# Patient Record
Sex: FEMALE | Race: WHITE | URBAN - METROPOLITAN AREA
[De-identification: names, ages, dates, MRNs, and addresses within clinical notes are randomized per-mention and may not be internally consistent; named-entity substitution may affect disease eponyms.]

---
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
## 2025-05-09 ENCOUNTER — APPOINTMENT (OUTPATIENT)
Dept: CT IMAGING | Facility: CLINIC | Age: 25
End: 2025-05-09
Attending: PHYSICIAN ASSISTANT
Payer: COMMERCIAL

## 2025-05-09 ENCOUNTER — HOSPITAL ENCOUNTER (EMERGENCY)
Facility: CLINIC | Age: 25
Discharge: HOME | End: 2025-05-09
Admitting: PHYSICIAN ASSISTANT
Payer: COMMERCIAL

## 2025-05-09 ENCOUNTER — APPOINTMENT (OUTPATIENT)
Dept: RADIOLOGY | Facility: CLINIC | Age: 25
End: 2025-05-09
Attending: PHYSICIAN ASSISTANT
Payer: COMMERCIAL

## 2025-05-09 VITALS
HEART RATE: 73 BPM | DIASTOLIC BLOOD PRESSURE: 88 MMHG | RESPIRATION RATE: 17 BRPM | TEMPERATURE: 97.5 F | HEIGHT: 64 IN | WEIGHT: 220 LBS | OXYGEN SATURATION: 96 % | SYSTOLIC BLOOD PRESSURE: 142 MMHG | BODY MASS INDEX: 37.56 KG/M2

## 2025-05-09 DIAGNOSIS — S09.90XA INJURY OF HEAD, INITIAL ENCOUNTER: ICD-10-CM

## 2025-05-09 DIAGNOSIS — G40.919 BREAKTHROUGH SEIZURE (H): ICD-10-CM

## 2025-05-09 DIAGNOSIS — S49.92XA SHOULDER INJURY, LEFT, INITIAL ENCOUNTER: ICD-10-CM

## 2025-05-09 LAB
ANION GAP SERPL CALCULATED.3IONS-SCNC: 14 MMOL/L (ref 7–15)
BASOPHILS # BLD AUTO: 0 10E3/UL (ref 0–0.2)
BASOPHILS NFR BLD AUTO: 0 %
BUN SERPL-MCNC: 9.8 MG/DL (ref 6–20)
CALCIUM SERPL-MCNC: 9.2 MG/DL (ref 8.8–10.4)
CHLORIDE SERPL-SCNC: 98 MMOL/L (ref 98–107)
CREAT SERPL-MCNC: 0.67 MG/DL (ref 0.51–0.95)
EGFRCR SERPLBLD CKD-EPI 2021: >90 ML/MIN/1.73M2
EOSINOPHIL # BLD AUTO: 0.2 10E3/UL (ref 0–0.7)
EOSINOPHIL NFR BLD AUTO: 2 %
ERYTHROCYTE [DISTWIDTH] IN BLOOD BY AUTOMATED COUNT: 15.9 % (ref 10–15)
GLUCOSE SERPL-MCNC: 101 MG/DL (ref 70–99)
HCG SERPL QL: NEGATIVE
HCO3 SERPL-SCNC: 24 MMOL/L (ref 22–29)
HCT VFR BLD AUTO: 37.5 % (ref 35–47)
HGB BLD-MCNC: 11.7 G/DL (ref 11.7–15.7)
IMM GRANULOCYTES # BLD: 0.1 10E3/UL
IMM GRANULOCYTES NFR BLD: 0 %
LYMPHOCYTES # BLD AUTO: 3.3 10E3/UL (ref 0.8–5.3)
LYMPHOCYTES NFR BLD AUTO: 24 %
MAGNESIUM SERPL-MCNC: 2 MG/DL (ref 1.7–2.3)
MCH RBC QN AUTO: 24.1 PG (ref 26.5–33)
MCHC RBC AUTO-ENTMCNC: 31.2 G/DL (ref 31.5–36.5)
MCV RBC AUTO: 77 FL (ref 78–100)
MONOCYTES # BLD AUTO: 0.7 10E3/UL (ref 0–1.3)
MONOCYTES NFR BLD AUTO: 5 %
NEUTROPHILS # BLD AUTO: 9.2 10E3/UL (ref 1.6–8.3)
NEUTROPHILS NFR BLD AUTO: 68 %
NRBC # BLD AUTO: 0 10E3/UL
NRBC BLD AUTO-RTO: 0 /100
PLATELET # BLD AUTO: 458 10E3/UL (ref 150–450)
POTASSIUM SERPL-SCNC: 3.7 MMOL/L (ref 3.4–5.3)
RBC # BLD AUTO: 4.86 10E6/UL (ref 3.8–5.2)
SODIUM SERPL-SCNC: 136 MMOL/L (ref 135–145)
WBC # BLD AUTO: 13.5 10E3/UL (ref 4–11)

## 2025-05-09 PROCEDURE — 70450 CT HEAD/BRAIN W/O DYE: CPT

## 2025-05-09 PROCEDURE — 96372 THER/PROPH/DIAG INJ SC/IM: CPT | Performed by: PHYSICIAN ASSISTANT

## 2025-05-09 PROCEDURE — 99285 EMERGENCY DEPT VISIT HI MDM: CPT | Mod: 25

## 2025-05-09 PROCEDURE — 250N000013 HC RX MED GY IP 250 OP 250 PS 637: Performed by: PHYSICIAN ASSISTANT

## 2025-05-09 PROCEDURE — 80048 BASIC METABOLIC PNL TOTAL CA: CPT

## 2025-05-09 PROCEDURE — 85025 COMPLETE CBC W/AUTO DIFF WBC: CPT

## 2025-05-09 PROCEDURE — 83735 ASSAY OF MAGNESIUM: CPT | Performed by: EMERGENCY MEDICINE

## 2025-05-09 PROCEDURE — 250N000011 HC RX IP 250 OP 636: Mod: JZ | Performed by: PHYSICIAN ASSISTANT

## 2025-05-09 PROCEDURE — 82565 ASSAY OF CREATININE: CPT

## 2025-05-09 PROCEDURE — 73030 X-RAY EXAM OF SHOULDER: CPT | Mod: LT

## 2025-05-09 PROCEDURE — 36415 COLL VENOUS BLD VENIPUNCTURE: CPT

## 2025-05-09 PROCEDURE — 80175 DRUG SCREEN QUAN LAMOTRIGINE: CPT | Performed by: EMERGENCY MEDICINE

## 2025-05-09 PROCEDURE — 84703 CHORIONIC GONADOTROPIN ASSAY: CPT

## 2025-05-09 PROCEDURE — 73080 X-RAY EXAM OF ELBOW: CPT | Mod: LT

## 2025-05-09 RX ORDER — ACETAMINOPHEN 325 MG/1
975 TABLET ORAL ONCE
Status: COMPLETED | OUTPATIENT
Start: 2025-05-09 | End: 2025-05-09

## 2025-05-09 RX ORDER — KETOROLAC TROMETHAMINE 15 MG/ML
15 INJECTION, SOLUTION INTRAMUSCULAR; INTRAVENOUS ONCE
Status: DISCONTINUED | OUTPATIENT
Start: 2025-05-09 | End: 2025-05-09

## 2025-05-09 RX ORDER — KETOROLAC TROMETHAMINE 15 MG/ML
15 INJECTION, SOLUTION INTRAMUSCULAR; INTRAVENOUS ONCE
Status: COMPLETED | OUTPATIENT
Start: 2025-05-09 | End: 2025-05-09

## 2025-05-09 RX ADMIN — ACETAMINOPHEN 975 MG: 325 TABLET, FILM COATED ORAL at 19:10

## 2025-05-09 RX ADMIN — KETOROLAC TROMETHAMINE 15 MG: 15 INJECTION, SOLUTION INTRAMUSCULAR; INTRAVENOUS at 19:11

## 2025-05-09 ASSESSMENT — COLUMBIA-SUICIDE SEVERITY RATING SCALE - C-SSRS
1. IN THE PAST MONTH, HAVE YOU WISHED YOU WERE DEAD OR WISHED YOU COULD GO TO SLEEP AND NOT WAKE UP?: NO
2. HAVE YOU ACTUALLY HAD ANY THOUGHTS OF KILLING YOURSELF IN THE PAST MONTH?: NO
6. HAVE YOU EVER DONE ANYTHING, STARTED TO DO ANYTHING, OR PREPARED TO DO ANYTHING TO END YOUR LIFE?: NO

## 2025-05-09 NOTE — ED TRIAGE NOTES
Patient reports sitting at the table last night ~0000 when she had a seizure. Patient fell to the ground and hit head on the wall. She has headache, left shoulder pain, tongue pain from biting. No missed doses recently. Hx. Of epilepsy. Seizure lasted ~5 minutes per patient repots from siblings who witnessed the seizure      Triage Assessment (Adult)       Row Name 05/09/25 1456          Triage Assessment    Airway WDL WDL        Respiratory WDL    Respiratory WDL WDL        Skin Circulation/Temperature WDL    Skin Circulation/Temperature WDL X  bites on tongue        Cardiac WDL    Cardiac WDL WDL        Peripheral/Neurovascular WDL    Peripheral Neurovascular WDL WDL        Cognitive/Neuro/Behavioral WDL    Cognitive/Neuro/Behavioral WDL WDL

## 2025-05-09 NOTE — ED PROVIDER NOTES
EMERGENCY DEPARTMENT ENCOUNTER   NAME: Юлия Doss ; AGE: 24 year old female ; YOB: 2000 ; MRN: [id number] ; PCP: Zaida Marie     Evaluation Date & Time: No admission date for patient encounter.    ED Provider: Sandra Coley PA-C    CHIEF COMPLAINT     Seizures, Shoulder Pain, and Headache      FINAL ASSESSMENT       ICD-10-CM    1. Injury of head, initial encounter  S09.90XA       2. Shoulder injury, left, initial encounter  S49.92XA       3. Breakthrough seizure (H)  G40.919           ED COURSE, MEDICAL DECISION MAKING, PLAN     ED course/notable events     4:33 PM Evaluated patient in an overflow triage area due to full department/inpatient boarders in the ED.    7:09 PM Rechecked and updated patient.   7:36 PM Signed out to Layne Villeda PA-C with labs pending.   ______________________________________________________________________    Reason for visit   Юлия Doss is a 24 year old female with epilepsy, DM2, obesity presenting for left shoulder pain and headache after a seizure that occurred last night.  Has a history of epilepsy, but states she has been under stress recently which does cause some breakthrough seizures for her.    Exam positives   Palpable pain to the right side of the skull. Bruising to the left elbow with some mild tenderness. Moves the elbow joint freely. Palpable pain to the superior/anterior left shoulder without bruising or swelling. Moves the shoulder joint freely.     Vitals   WNL    Labs   Pending at time of sign out.      EKG   /    Imaging   CT head  Radiologist read: Normal head CT.     X-ray left shoulder  Independent interpretation: I cannot appreciate any acute fracture or dislocation.  Radiologist read: Normal joint spaces and alignment. No fracture.     Left elbow x-ray  Independent interpretation: I cannot appreciate any acute fracture or dislocation.  Radiologist read: Normal joint spaces and alignment. No  "fracture or joint effusion.     Interventions/recheck   Re-check pending at time of sign out.     Medications   acetaminophen (TYLENOL) tablet 975 mg (975 mg Oral $Given 5/9/25 1910)   ketorolac (TORADOL) injection 15 mg (15 mg Intramuscular $Given 5/9/25 1911)       Procedures  /    Consults   /    Assessment   Head injury   Left shoulder injury   Breakthrough seizure    CT of the head reassuring against any bleed or skull fracture.   Low concern for a significant concussion as she is not really having any concerning symptoms including nausea, dizziness, photophobia, vision changes, behavior changes, confusion, etc.   No fracture or dislocation of the shoulder or elbow apparent on xray. She is also able to move them around freely making a significant injury highly unlikely. She is neurovascularly intact.     Basic labs ordered and are pending at time of sign out.   I anticipate discharge with supportive cares and close PCP/neurology follow up.     Plan   -Disposition: Admission under consideration. Patient signed out to colleague with final disposition pending.        Medical decision making factors  Independent historian(s): N/A  Care impacted by chronic illnesses: epilepsy  External records reviewed: N/A  Independent interpretation: Independently interpreted images as charted above under \"imaging.\"   Consults: N/A  Disposition: See above under \"plan\"  Prescriptions: None    MIPS: Adult Minor Head Trauma:Loss of consciousness with a headache  Critical care: N/A  Sepsis: None         HISTORY OF PRESENT ILLNESS   Patient information was obtained from: Patient   Use of Intrepreter: None     Pertinent past medical history: epilepsy, DM2, obesity     Юлия Doss is here by means of walk in  with parent  for evaluation of head pain and left shoulder pain after a seizure that happened last night.     Patient states she has been very stressed recently and this is a trigger for her seizures. States the last " "seizure was about a month ago when she was also very stressed and had missed a few doses of her Lamictal.     States her family witnessed the seizure and states it lasted about 5 minutes. She was sitting at the table when the seizure occurred. She reportedly hit her left shoulder, left elbow, and her head during the seizure.     Today, she is noticing ongoing left shoulder pain, some left elbow discomfort, and a generalized headache that is worse on the right side.     No nausea or vomiting.   No vision changes or photophobia.   No neck or back pain.   No abdominal pain.   No dizziness.     Denies any chance of pregnancy.     No other concerns.       PHYSICAL EXAM     First Vitals:  Patient Vitals for the past 24 hrs:   BP Temp Temp src Pulse Resp SpO2 Height Weight   05/09/25 1454 130/80 97.5  F (36.4  C) Temporal 80 18 97 % 1.613 m (5' 3.5\") 99.8 kg (220 lb)       PHYSICAL EXAM:   Constitutional: No acute distress.  Neuro: Awake and alert. No focal deficits.  Psych: Calm and cooperative.  Head: Palpable pain to the right side of the skull. No appreciable masses, bruising, or open wounds.   Eyes: PERRL. EOMI. Conjunctivae clear. No crusting or mattering of the lids or lashes.   Ears: No hemotympanum b/l.    Mouth: Pink and moist.    Neck: FROM. No palpable pain over the cervical spine. No palpable pain over the paraspinal musculature.   Cardio: Regular rate. Adequate perfusion to extremities. Regular rhythm. No murmurs.  Pulmonary: Oxygenating well on RA. No labored breathing. No wheezes. No rales. No rhonchi.   Back: Appears to move freely. No palpable pain over the thoracic/lumbar spine. No palpable paraspinal tenderness.    Upper extremities: Bruising to the left elbow with some mild tenderness. Moves the elbow joint freely. Palpable pain to the superior/anterior left shoulder without bruising or swelling. Moves the joint freely. Distal pulses intact. Sensations intact.   Lower extremities: Moves freely.   Skin: " See above. All other skin is natural color, warm, dry, intact.         MEDICAL HISTORY     No past medical history on file.    No past surgical history on file.    No family history on file.         Medications   No current outpatient medications on file.      RESULTS     LAB:  All pertinent labs reviewed and interpreted  Labs Ordered and Resulted from Time of ED Arrival to Time of ED Departure   CBC WITH PLATELETS AND DIFFERENTIAL - Abnormal       Result Value    WBC Count 13.5 (*)     RBC Count 4.86      Hemoglobin 11.7      Hematocrit 37.5      MCV 77 (*)     MCH 24.1 (*)     MCHC 31.2 (*)     RDW 15.9 (*)     Platelet Count 458 (*)     % Neutrophils 68      % Lymphocytes 24      % Monocytes 5      % Eosinophils 2      % Basophils 0      % Immature Granulocytes 0      NRBCs per 100 WBC 0      Absolute Neutrophils 9.2 (*)     Absolute Lymphocytes 3.3      Absolute Monocytes 0.7      Absolute Eosinophils 0.2      Absolute Basophils 0.0      Absolute Immature Granulocytes 0.1      Absolute NRBCs 0.0     BASIC METABOLIC PANEL   HCG QUALITATIVE PREGNANCY   LAMOTRIGINE LEVEL   MAGNESIUM       RADIOLOGY:  XR Shoulder Left G/E 3 Views   Final Result   IMPRESSION: Normal joint spaces and alignment. No fracture.      XR Elbow Left G/E 3 Views   Final Result   IMPRESSION: Normal joint spaces and alignment. No fracture or joint effusion.      CT Head w/o Contrast   Final Result   IMPRESSION:   1.  Normal head CT.               Some or all of this documentation has been completed using dictation software and grammatical errors may be present. Please contact me with any concerns regarding this.     Sandra Coley PA-C  Emergency Medicine   [facility] EMERGENCY ROOM       Sandra Coley PA-C  05/09/25 7462

## 2025-05-09 NOTE — LETTER
May 9, 2025      To Whom It May Concern:      Юлия Doss was seen in our Emergency Department today, 05/09/25.  I expect her condition to improve over the next 2 days.  She may return to work/school when improved.    Sincerely,        Sneha Benitez RN  Electronically signed

## 2025-05-10 NOTE — DISCHARGE INSTRUCTIONS
You were seen today for a breakthrough seizure resulting in a mild head injury and left shoulder injury. Thankfully, your imaging was unremarkable.   Labs without anything acutely concerning.     As discussed here, use Tylenol and ibuprofen to help with pain.  You can also use ice and heat.  And also consider using topicals such as lidocaine patches, IcyHot, Biofreeze, etc.    Since you may have a very mild concussion, I do recommend eye rest.  This means limiting your time on screens such as cell phones, computers, tablets, and televisions.  Get plenty of rest.  Avoid any overstimulating activities.    Please follow-up with your primary care provider and neurologist for recheck this week.    Return to the ER for any new or worsening symptoms.

## 2025-05-10 NOTE — ED PROVIDER NOTES
eMERGENCY dEPARTMENT PROGRESS NOTE         ED COURSE AND MEDICAL DECISION MAKING  Patient was signed out to me by Sandra Coley PA-C at 7:40 PM.    8:36 PM Discussed discharge with the patient.     Patient had a seizure while sitting at a table last night at around midnight. She fell onto the ground, hitting her head on the wall. Patient endorses headache, left shoulder pain, and tongue pain from biting it. History of epilepsy, compliant with medications.     Patient's care was signed out to me pending blood work.  CT scan of the head and x-rays of the left shoulder and elbow were negative.  CBC is remarkable for leukocytosis of 13.5, likely reactive.  BMP unremarkable.  Magnesium unremarkable.  Pregnancy test negative.  Lamictal level is pending.    The patient was given Toradol and Tylenol here with improvement in her symptoms.  I do not think any further emergent labs or imaging are indicated at this time.  She is overall well-appearing here and hemodynamically stable.  Her workup today is reassuring without evidence of complications as a result of her seizure-she has a known seizure disorder.  Advised follow-up with neurologist for recheck and return here for any new or worsening symptoms.  The patient is agreeable with this treatment plan and verbalized understanding.    At the conclusion of the encounter I discussed the results of all of the tests and the disposition. The questions were answered. The patient or family acknowledged understanding and was agreeable with the care plan.     LAB  Pertinent labs results reviewed   Results for orders placed or performed during the hospital encounter of 05/09/25   XR Shoulder Left G/E 3 Views    Impression    IMPRESSION: Normal joint spaces and alignment. No fracture.   CT Head w/o Contrast    Impression    IMPRESSION:  1.  Normal head CT.   XR Elbow Left G/E 3 Views    Impression    IMPRESSION: Normal joint spaces and alignment. No fracture or joint effusion.    Basic metabolic panel   Result Value Ref Range    Sodium 136 135 - 145 mmol/L    Potassium 3.7 3.4 - 5.3 mmol/L    Chloride 98 98 - 107 mmol/L    Carbon Dioxide (CO2) 24 22 - 29 mmol/L    Anion Gap 14 7 - 15 mmol/L    Urea Nitrogen 9.8 6.0 - 20.0 mg/dL    Creatinine 0.67 0.51 - 0.95 mg/dL    GFR Estimate >90 >60 mL/min/1.73m2    Calcium 9.2 8.8 - 10.4 mg/dL    Glucose 101 (H) 70 - 99 mg/dL   HCG QUALitative pregnancy (blood)   Result Value Ref Range    hCG Serum Qualitative Negative Negative   CBC with platelets and differential   Result Value Ref Range    WBC Count 13.5 (H) 4.0 - 11.0 10e3/uL    RBC Count 4.86 3.80 - 5.20 10e6/uL    Hemoglobin 11.7 11.7 - 15.7 g/dL    Hematocrit 37.5 35.0 - 47.0 %    MCV 77 (L) 78 - 100 fL    MCH 24.1 (L) 26.5 - 33.0 pg    MCHC 31.2 (L) 31.5 - 36.5 g/dL    RDW 15.9 (H) 10.0 - 15.0 %    Platelet Count 458 (H) 150 - 450 10e3/uL    % Neutrophils 68 %    % Lymphocytes 24 %    % Monocytes 5 %    % Eosinophils 2 %    % Basophils 0 %    % Immature Granulocytes 0 %    NRBCs per 100 WBC 0 <1 /100    Absolute Neutrophils 9.2 (H) 1.6 - 8.3 10e3/uL    Absolute Lymphocytes 3.3 0.8 - 5.3 10e3/uL    Absolute Monocytes 0.7 0.0 - 1.3 10e3/uL    Absolute Eosinophils 0.2 0.0 - 0.7 10e3/uL    Absolute Basophils 0.0 0.0 - 0.2 10e3/uL    Absolute Immature Granulocytes 0.1 <=0.4 10e3/uL    Absolute NRBCs 0.0 10e3/uL   Result Value Ref Range    Magnesium 2.0 1.7 - 2.3 mg/dL         RADIOLOGY    Pertinent imaging reviewed   Please see official radiology report.  XR Shoulder Left G/E 3 Views   Final Result   IMPRESSION: Normal joint spaces and alignment. No fracture.      XR Elbow Left G/E 3 Views   Final Result   IMPRESSION: Normal joint spaces and alignment. No fracture or joint effusion.      CT Head w/o Contrast   Final Result   IMPRESSION:   1.  Normal head CT.          FINAL IMPRESSION    1. Injury of head, initial encounter    2. Shoulder injury, left, initial encounter    3. Breakthrough seizure  (H)         DISCHARGE PRESCRIPTIONS  There are no discharge medications for this patient.           Layne Villeda PA-C  05/10/25 0111

## 2025-05-11 LAB — LAMOTRIGINE SERPL-MCNC: 3.8 UG/ML
